# Patient Record
Sex: FEMALE | Race: WHITE | Employment: UNEMPLOYED | URBAN - NONMETROPOLITAN AREA
[De-identification: names, ages, dates, MRNs, and addresses within clinical notes are randomized per-mention and may not be internally consistent; named-entity substitution may affect disease eponyms.]

---

## 2021-01-05 ENCOUNTER — HOSPITAL ENCOUNTER (EMERGENCY)
Age: 21
Discharge: HOME | End: 2021-01-05
Payer: COMMERCIAL

## 2021-01-05 VITALS
WEIGHT: 140 LBS | BODY MASS INDEX: 25.76 KG/M2 | HEIGHT: 62 IN | TEMPERATURE: 97.6 F | DIASTOLIC BLOOD PRESSURE: 59 MMHG | RESPIRATION RATE: 20 BRPM | HEART RATE: 88 BPM | SYSTOLIC BLOOD PRESSURE: 113 MMHG | OXYGEN SATURATION: 97 %

## 2021-01-05 DIAGNOSIS — N30.00 ACUTE CYSTITIS WITHOUT HEMATURIA: Primary | ICD-10-CM

## 2021-01-05 LAB
BILIRUBIN URINE: NEGATIVE
BLOOD, URINE: NEGATIVE
CHARACTER, URINE: CLEAR
COLOR: ABNORMAL
GLUCOSE URINE: 100 MG/DL
KETONES, URINE: 15
LEUKOCYTE ESTERASE, URINE: NEGATIVE
NITRITE, URINE: POSITIVE
PH UA: 5.5 (ref 5–9)
PROTEIN UA: NEGATIVE MG/DL
SPECIFIC GRAVITY UA: >= 1.03 (ref 1–1.03)
UROBILINOGEN, URINE: 1 EU/DL (ref 0.2–1)

## 2021-01-05 PROCEDURE — 99203 OFFICE O/P NEW LOW 30 MIN: CPT

## 2021-01-05 PROCEDURE — 87086 URINE CULTURE/COLONY COUNT: CPT

## 2021-01-05 PROCEDURE — 99213 OFFICE O/P EST LOW 20 MIN: CPT

## 2021-01-05 PROCEDURE — 81003 URINALYSIS AUTO W/O SCOPE: CPT

## 2021-01-05 PROCEDURE — 99203 OFFICE O/P NEW LOW 30 MIN: CPT | Performed by: NURSE PRACTITIONER

## 2021-01-05 RX ORDER — NITROFURANTOIN 25; 75 MG/1; MG/1
100 CAPSULE ORAL 2 TIMES DAILY
Qty: 10 CAPSULE | Refills: 0 | Status: SHIPPED | OUTPATIENT
Start: 2021-01-05 | End: 2021-01-10

## 2021-01-05 RX ORDER — SULFAMETHOXAZOLE AND TRIMETHOPRIM 400; 80 MG/1; MG/1
1 TABLET ORAL 2 TIMES DAILY
COMMUNITY
End: 2021-01-05 | Stop reason: ALTCHOICE

## 2021-01-05 ASSESSMENT — ENCOUNTER SYMPTOMS
ABDOMINAL PAIN: 0
NAUSEA: 0
VOMITING: 0
SHORTNESS OF BREATH: 0
COUGH: 0

## 2021-01-05 NOTE — ED PROVIDER NOTES
[place] [address]  Urgent Care Encounter       CHIEF COMPLAINT       Chief Complaint   Patient presents with    Dysuria       Nurses Notes reviewed and I agree except as noted in the HPI. HISTORY OF PRESENT ILLNESS   Rivera Ballard is a 24 y.o. female who presents for evaluation of nausea, vomiting with continued dysuria. Patient reports UTI symptoms starting 3 to 4 days ago and was prescribed Bactrim for UTI without culture and sensitivity as she was seen via virtual visit. Patient unable to take medication without becoming nauseous or vomiting. Denies fevers, chills, abdominal and back pain. Reports taking Bactrim in the past with no complications. Patient reports she has been taking AZO for the symptoms at home which provides intermittent relief. The history is provided by the patient. REVIEW OF SYSTEMS     Review of Systems   Constitutional: Negative for chills and fever. Respiratory: Negative for cough and shortness of breath. Cardiovascular: Negative for chest pain. Gastrointestinal: Negative for abdominal pain, nausea and vomiting. Genitourinary: Positive for dysuria and frequency. Musculoskeletal: Negative for arthralgias and myalgias. Skin: Negative for rash. Neurological: Negative for headaches. PAST MEDICAL HISTORY   History reviewed. No pertinent past medical history. SURGICALHISTORY     Patient  has a past surgical history that includes Tympanostomy tube placement. CURRENT MEDICATIONS       Previous Medications    LACTOBACILLUS (AZO COMPLETE FEMININE BALANCE PO)    Take by mouth       ALLERGIES     Patient is has No Known Allergies. Patients   There is no immunization history on file for this patient. FAMILY HISTORY     Patient's family history is not on file. SOCIAL HISTORY     Patient  reports that she has never smoked. She has never used smokeless tobacco. She reports that she does not drink alcohol or use drugs.     PHYSICAL EXAM Positive (A) NEGATIVE    Leukocyte Esterase, Urine Negative NEGATIVE    Color, UA Tanesha (A) STRAW-YELLOW    Character, Urine Clear CLEAR-SL CLOUD       IMAGING:    No orders to display         EKG:  none    URGENT CARE COURSE:     Vitals:    01/05/21 1144   BP: (!) 113/59   Pulse: 88   Resp: 20   Temp: 97.6 °F (36.4 °C)   TempSrc: Temporal   SpO2: 97%   Weight: 140 lb (63.5 kg)   Height: 5' 2\" (1.575 m)       Medications - No data to display         PROCEDURES:  None    FINAL IMPRESSION      1. Acute cystitis without hematuria          DISPOSITION/ PLAN       Urine sample was obtained which does demonstrate a urinary tract infection at this time. I discussed with the patient that we will discontinue the Bactrim as she has not noticed any relief after 3 days and she will be placed on oral Macrobid for treatment of the urinary tract infection. She also be given Zofran for nausea and is advised to remain hydrated. She is advised to present to the ER if her symptoms worsen or follow-up with her PCP on an outpatient basis. She is agreeable to plan as discussed. PATIENT REFERRED TO:  Imtiaz Thompson MD  [address] [facility] A / [facility] YRIS [facility] [address]      DISCHARGE MEDICATIONS:  New Prescriptions    NITROFURANTOIN, MACROCRYSTAL-MONOHYDRATE, (MACROBID) 100 MG CAPSULE    Take 1 capsule by mouth 2 times daily for 5 days       Discontinued Medications    DESIPRAMINE (NOPRAMIN) 10 MG TABLET    Take 10 mg by mouth nightly. Take two tablets at bedside.      SULFAMETHOXAZOLE-TRIMETHOPRIM (BACTRIM;SEPTRA) 400-80 MG PER TABLET    Take 1 tablet by mouth 2 times daily       Current Discharge Medication List          MARÍA Melara CNP    (Please note that portions of this note were completed with a voice recognition program. Efforts were made to edit the dictations but occasionally words are mis-transcribed.)          MARÍA Melara CNP  01/05/21 1127

## 2021-01-05 NOTE — ED NOTES
Patient understood instructions verbally,  Follow up with PCP with any concerns, or worse UTI symptoms with elevated fevers follow up with ED. E-script,. ambulated self to lobby,stable condition.       Lv Dotson LPN  17/92/27 6628

## 2021-01-05 NOTE — ED TRIAGE NOTES
Patient to room with c/o continued pain with urination beginning three days ago. Continues on previously prescribed oral antibiotics x 3 days. States nausea while taking medication. Urine specimen obtained. Lab notified.

## 2021-01-07 LAB
ORGANISM: ABNORMAL
URINE CULTURE, ROUTINE: ABNORMAL

## 2021-03-13 LAB
BILIRUBIN URINE: ABNORMAL
BLOOD, URINE: NEGATIVE
CLARITY: ABNORMAL
COLOR: ABNORMAL
GLUCOSE URINE: NEGATIVE
KETONES, URINE: ABNORMAL
LEUKOCYTE ESTERASE, URINE: NEGATIVE
NITRITE, URINE: NEGATIVE
PH UA: 5.5 (ref 4.5–8)
PROTEIN UA: NEGATIVE
SPECIFIC GRAVITY UA: 1.03 (ref 1–1.03)
UROBILINOGEN, URINE: ABNORMAL

## 2021-12-30 LAB
BILIRUBIN URINE: ABNORMAL
BLOOD, URINE: NEGATIVE
CLARITY: CLEAR
COLOR: ABNORMAL
GLUCOSE URINE: NEGATIVE
KETONES, URINE: NEGATIVE
LEUKOCYTE ESTERASE, URINE: ABNORMAL
NITRITE, URINE: POSITIVE
PH UA: 5.5 (ref 4.5–8)
PROTEIN UA: ABNORMAL
SPECIFIC GRAVITY UA: 1.02 (ref 1–1.03)
UROBILINOGEN, URINE: NORMAL

## 2022-02-02 ENCOUNTER — OFFICE VISIT (OUTPATIENT)
Dept: UROLOGY | Age: 22
End: 2022-02-02
Payer: COMMERCIAL

## 2022-02-02 ENCOUNTER — TELEPHONE (OUTPATIENT)
Dept: UROLOGY | Age: 22
End: 2022-02-02

## 2022-02-02 VITALS
HEIGHT: 62 IN | SYSTOLIC BLOOD PRESSURE: 108 MMHG | WEIGHT: 143 LBS | BODY MASS INDEX: 26.31 KG/M2 | DIASTOLIC BLOOD PRESSURE: 62 MMHG

## 2022-02-02 DIAGNOSIS — R35.0 URINARY FREQUENCY: Primary | ICD-10-CM

## 2022-02-02 LAB
BILIRUBIN URINE: ABNORMAL
BLOOD URINE, POC: ABNORMAL
CHARACTER, URINE: CLEAR
COLOR, URINE: YELLOW
GLUCOSE URINE: NEGATIVE MG/DL
KETONES, URINE: 80
LEUKOCYTE CLUMPS, URINE: NEGATIVE
NITRITE, URINE: NEGATIVE
PH, URINE: 7 (ref 5–9)
POST VOID RESIDUAL (PVR): 0 ML
PROTEIN, URINE: NEGATIVE MG/DL
SPECIFIC GRAVITY, URINE: 1.02 (ref 1–1.03)
UROBILINOGEN, URINE: 0.2 EU/DL (ref 0–1)

## 2022-02-02 PROCEDURE — 99204 OFFICE O/P NEW MOD 45 MIN: CPT | Performed by: NURSE PRACTITIONER

## 2022-02-02 PROCEDURE — 51798 US URINE CAPACITY MEASURE: CPT | Performed by: NURSE PRACTITIONER

## 2022-02-02 PROCEDURE — 81003 URINALYSIS AUTO W/O SCOPE: CPT | Performed by: NURSE PRACTITIONER

## 2022-02-02 RX ORDER — NITROFURANTOIN 25; 75 MG/1; MG/1
CAPSULE ORAL
COMMUNITY
Start: 2022-01-29 | End: 2022-02-02

## 2022-02-02 RX ORDER — LEVONORGESTREL AND ETHINYL ESTRADIOL 0.1-0.02MG
KIT ORAL
COMMUNITY
Start: 2021-11-11

## 2022-02-02 RX ORDER — NITROFURANTOIN 25; 75 MG/1; MG/1
CAPSULE ORAL
Qty: 30 CAPSULE | Refills: 2 | Status: SHIPPED | OUTPATIENT
Start: 2022-02-02

## 2022-02-02 RX ORDER — AMOXICILLIN AND CLAVULANATE POTASSIUM 875; 125 MG/1; MG/1
1 TABLET, FILM COATED ORAL 2 TIMES DAILY
Qty: 14 TABLET | Refills: 0 | Status: SHIPPED | OUTPATIENT
Start: 2022-02-02 | End: 2022-02-09

## 2022-02-02 ASSESSMENT — ENCOUNTER SYMPTOMS
BACK PAIN: 0
ABDOMINAL PAIN: 0
VOMITING: 0
NAUSEA: 0

## 2022-02-02 NOTE — PATIENT INSTRUCTIONS
Patient Education        Urinary Tract Infection (UTI) in Women: Care Instructions  Overview     A urinary tract infection, or UTI, is a general term for an infection anywhere between the kidneys and the urethra (where urine comes out). Most UTIs are bladder infections. They often cause pain or burning when you urinate. UTIs are caused by bacteria and can be cured with antibiotics. Be sure to complete your treatment so that the infection does not get worse. Follow-up care is a key part of your treatment and safety. Be sure to make and go to all appointments, and call your doctor if you are having problems. It's also a good idea to know your test results and keep a list of the medicines you take. How can you care for yourself at home? · Take your antibiotics as directed. Do not stop taking them just because you feel better. You need to take the full course of antibiotics. · Drink extra water and other fluids for the next day or two. This will help make the urine less concentrated and help wash out the bacteria that are causing the infection. (If you have kidney, heart, or liver disease and have to limit fluids, talk with your doctor before you increase the amount of fluids you drink.)  · Avoid drinks that are carbonated or have caffeine. They can irritate the bladder. · Urinate often. Try to empty your bladder each time. · To relieve pain, take a hot bath or lay a heating pad set on low over your lower belly or genital area. Never go to sleep with a heating pad in place. To prevent UTIs  · Drink plenty of water each day. This helps you urinate often, which clears bacteria from your system. (If you have kidney, heart, or liver disease and have to limit fluids, talk with your doctor before you increase the amount of fluids you drink.)  · Urinate when you need to. · If you are sexually active, urinate right after you have sex. · Change sanitary pads often.   · Avoid douches, bubble baths, feminine hygiene sprays, and other feminine hygiene products that have deodorants. · After going to the bathroom, wipe from front to back. When should you call for help? Call your doctor now or seek immediate medical care if:    · Symptoms such as fever, chills, nausea, or vomiting get worse or appear for the first time.     · You have new pain in your back just below your rib cage. This is called flank pain.     · There is new blood or pus in your urine.     · You have any problems with your antibiotic medicine. Watch closely for changes in your health, and be sure to contact your doctor if:    · You are not getting better after taking an antibiotic for 2 days.     · Your symptoms go away but then come back. Where can you learn more? Go to https://My[employer] Networkseweb.[employer]. org and sign in to your Yellow Pages account. Enter G736 in the [employer] box to learn more about \"Urinary Tract Infection (UTI) in Women: Care Instructions. \"     If you do not have an account, please click on the \"Sign Up Now\" link. Current as of: February 10, 2021               Content Version: 13.1  © 8737-3089 [employer]. Care instructions adapted under license by [facility] ([facility]). If you have questions about a medical condition or this instruction, always ask your healthcare professional. [place] [address] any warranty or liability for your use of this information. Patient Education         Urinary Tract Infection: Here's Help (01:42)  Your health professional recommends that you watch this short online health video. Take a minute to learn about a urinary tract infection and what you can do to feel better. Purpose:  Briefly describes what a UTI is and how it's treated. Goal:  Users will understand what a urinary tract infection is and how it's treated. How to watch the video    Scan the QR code   OR Visit the website    https://hwi. se/r/F5zycacqpdv2j   Current as of: February 10, 2021               Content Version: 13.1  © 1105-0486 [employer]. Care instructions adapted under license by [facility] ([facility]). If you have questions about a medical condition or this instruction, always ask your healthcare professional. [address] any warranty or liability for your use of this information.

## 2022-02-02 NOTE — PROGRESS NOTES
[address]  [address]  [facility] [address]  Dept: [phone]  Loc: [phone]    Visit Date: 2/2/2022        HPI:     Mel Franco is a 22 y.o. female who presents today for:  Chief Complaint   Patient presents with    New Patient     dysuria, nocturia, frequency, burning with urinartion, referred by Megan Roque PA-C       HPI   Pt seen in referral by Bacilio Bunn for recurrent UTIs and dysuria. Christopher Willis reports a hx of UTIs in the last 1-2 years with recent worsening in incidence. She 2-3 UTIs in the last 4 weeks. She is currently being treated for a UTI with Macrobid after presenting to an urgent care in [place] on Sunday of this week. She reports she was told the culture showed Group B strep. She has yet to note improvement in symptoms despite the Macrobid. Pt notes a correlation between her UTIs and intercourse. She has one sexual partner currently and notes no recent change in partner. Started D mannose 500 mg TID in early January but had forgotten to take it for a few days. Denies hematuria, fever, chills. Urine culture from 12/29/21 with pan-sensitive E coli. Mom accompanies pt to appt today. Current Outpatient Medications   Medication Sig Dispense Refill    nitrofurantoin, macrocrystal-monohydrate, (MACROBID) 100 MG capsule TAKE 1 CAPSULE BY MOUTH TWICE DAILY      levonorgestrel-ethinyl estradiol (AVIANE;ALESSE;LESSINA) 0.1-20 MG-MCG per tablet take 1 tablet by mouth once daily      Lactobacillus (AZO COMPLETE FEMININE BALANCE PO) Take by mouth       No current facility-administered medications for this visit. Past Medical History  Christopher Willis  has no past medical history on file. Past Surgical History  The patient  has a past surgical history that includes Tympanostomy tube placement. Family History  This patient's family history is not on file.     Social History  Christopher Willis reports that she has never smoked. She has never used smokeless tobacco. She reports that she does not drink alcohol and does not use drugs. Subjective:      Review of Systems   Constitutional: Negative for activity change, appetite change, chills, diaphoresis, fatigue, fever and unexpected weight change. Gastrointestinal: Negative for abdominal pain, nausea and vomiting. Genitourinary: Positive for dysuria, frequency and urgency. Negative for decreased urine volume, difficulty urinating, flank pain and hematuria. Musculoskeletal: Negative for back pain. Objective:   /62   Ht 5' 2\" (1.575 m)   Wt 143 lb (64.9 kg)   BMI 26.16 kg/m²     Physical Exam  Vitals reviewed. Constitutional:       General: She is not in acute distress. Appearance: Normal appearance. She is well-developed. She is not ill-appearing or diaphoretic. HENT:      Head: Normocephalic and atraumatic. Right Ear: External ear normal.      Left Ear: External ear normal.      Nose: Nose normal.      Mouth/Throat:      Mouth: Mucous membranes are moist.   Eyes:      General: No scleral icterus. Right eye: No discharge. Left eye: No discharge. Neck:      Vascular: No JVD. Trachea: No tracheal deviation. Cardiovascular:      Rate and Rhythm: Normal rate and regular rhythm. Pulmonary:      Effort: Pulmonary effort is normal. No respiratory distress. Breath sounds: Normal breath sounds. Abdominal:      General: There is no distension. Tenderness: There is no abdominal tenderness. There is no right CVA tenderness or left CVA tenderness. Musculoskeletal:         General: No tenderness. Normal range of motion. Neurological:      Mental Status: She is alert and oriented to person, place, and time. Mental status is at baseline. Psychiatric:         Mood and Affect: Mood normal.         Behavior: Behavior normal.         Thought Content:  Thought content normal.         POC  Results for POC orders placed in visit on 02/02/22   POCT Urinalysis No Micro (Auto)   Result Value Ref Range    Glucose, Ur Negative NEGATIVE mg/dl    Bilirubin Urine Small (A)     Ketones, Urine 80 (A) NEGATIVE    Specific Gravity, Urine 1.025 1.002 - 1.030    Blood, UA POC Trace-intact NEGATIVE    pH, Urine 7.00 5.0 - 9.0    Protein, Urine Negative NEGATIVE mg/dl    Urobilinogen, Urine 0.20 0.0 - 1.0 eu/dl    Nitrite, Urine Negative NEGATIVE    Leukocyte Clumps, Urine Negative NEGATIVE    Color, Urine Yellow YELLOW-STRAW    Character, Urine Clear CLR-SL.CLOUD   poct post void residual   Result Value Ref Range    post void residual 0 ml       Patients recent PSA values are as follows  No results found for: PSA, PSADIA     Recent BUN/Creatinine:  Lab Results   Component Value Date    BUN 12 06/04/2015    CREATININE 0.6 06/04/2015       Assessment:   Recurrent UTI    Plan:     Bernard Max reports recent recurrent/unresolving UTIs. Currently on Macrobid for reported Group B strep in the urine. Notes symptoms have yet to improve. Will start Augmentin empirically and obtain culture results from outside facility for review. Pt aware of hygiene measures to aid in prevention of UTIs. Reviewed increasing oral fluids, cleansing/wiping from front to back, urinating after intercourse. Informational handout regarding UTIs and prevention included on AVS.     Given the post-coital incidence of her utis will start Gwen on Macrobid 100 mg PO once following intercourse. Script sent to pharmacy. Continue D mannose 500 mg PO TID and discussed increasing D mannose for mild symptoms.       F/u in 2-3 months with PVR

## 2022-02-02 NOTE — TELEPHONE ENCOUNTER
Pt had urine culture performed at [facility] in [place], [place] on Sunday. Can we get records and urine culture results for review? Thank-you.

## 2022-02-10 NOTE — TELEPHONE ENCOUNTER
Received records from [facility], [facility] in [facility], routed to [place]

## 2022-04-14 ENCOUNTER — OFFICE VISIT (OUTPATIENT)
Dept: UROLOGY | Age: 22
End: 2022-04-14
Payer: COMMERCIAL

## 2022-04-14 VITALS
DIASTOLIC BLOOD PRESSURE: 70 MMHG | BODY MASS INDEX: 26.13 KG/M2 | SYSTOLIC BLOOD PRESSURE: 112 MMHG | HEIGHT: 62 IN | WEIGHT: 142 LBS

## 2022-04-14 DIAGNOSIS — R35.0 URINARY FREQUENCY: Primary | ICD-10-CM

## 2022-04-14 LAB — POST VOID RESIDUAL (PVR): 61 ML

## 2022-04-14 PROCEDURE — 51798 US URINE CAPACITY MEASURE: CPT | Performed by: NURSE PRACTITIONER

## 2022-04-14 PROCEDURE — 99213 OFFICE O/P EST LOW 20 MIN: CPT | Performed by: NURSE PRACTITIONER

## 2022-04-14 ASSESSMENT — ENCOUNTER SYMPTOMS
VOMITING: 0
NAUSEA: 0
BACK PAIN: 0
ABDOMINAL PAIN: 0

## 2022-04-14 NOTE — PROGRESS NOTES
[address] [address] Leevard [address] [place] [id number]  Dept: [phone]  Loc: [phone]    Visit Date: 4/14/2022        HPI:     Chris Meza is a 22 y.o. female who presents today for:  Chief Complaint   Patient presents with    Follow-up    Urinary Frequency       HPI   Pt seen in follow up for recurrent UTIs and dysuria. Opal Andrea has a hx of UTIs over the last 1-2 years time with recent worsening in incidence. She was referred to our office after having 2-3 UTIs in 4 weeks time. She noted a correlation between her UTIs and intercourse. We had her start D mannose 500 mg TID and started post-coital Macrobid. She reports she is doing well and hasn't had any further utis. She is very happy with the control of her symptoms at this time. Current Outpatient Medications   Medication Sig Dispense Refill    levonorgestrel-ethinyl estradiol (AVIANE;ALESSE;LESSINA) 0.1-20 MG-MCG per tablet take 1 tablet by mouth once daily      D-Mannose 500 MG CAPS Take 500 mg by mouth 3 times daily 90 capsule     nitrofurantoin, macrocrystal-monohydrate, (MACROBID) 100 MG capsule Take 1 capsule by mouth following intercourse. 30 capsule 2    Lactobacillus (AZO COMPLETE FEMININE BALANCE PO) Take by mouth       No current facility-administered medications for this visit. Past Medical History  Opal Andrea  has no past medical history on file. Past Surgical History  The patient  has a past surgical history that includes Tympanostomy tube placement. Family History  This patient's family history is not on file. Social History  Opal Andrea  reports that she has never smoked. She has never used smokeless tobacco. She reports that she does not drink alcohol and does not use drugs. Subjective:      Review of Systems   Constitutional: Negative for activity change, appetite change, chills, diaphoresis, fatigue, fever and unexpected weight change. Gastrointestinal: Negative for abdominal pain, nausea and vomiting. Genitourinary: Negative for decreased urine volume, difficulty urinating, dysuria, flank pain, frequency, hematuria and urgency. Musculoskeletal: Negative for back pain. Objective:   /70   Ht 5' 2\" (1.575 m)   Wt 142 lb (64.4 kg)   BMI 25.97 kg/m²     Physical Exam  Vitals reviewed. Constitutional:       General: She is not in acute distress. Appearance: Normal appearance. She is well-developed. She is not ill-appearing or diaphoretic. HENT:      Head: Normocephalic and atraumatic. Right Ear: External ear normal.      Left Ear: External ear normal.      Nose: Nose normal.      Mouth/Throat:      Mouth: Mucous membranes are moist.   Eyes:      General: No scleral icterus. Right eye: No discharge. Left eye: No discharge. Neck:      Vascular: No JVD. Trachea: No tracheal deviation. Pulmonary:      Effort: Pulmonary effort is normal. No respiratory distress. Abdominal:      General: There is no distension. Tenderness: There is no abdominal tenderness. There is no right CVA tenderness or left CVA tenderness. Musculoskeletal:         General: No tenderness. Normal range of motion. Neurological:      Mental Status: She is alert and oriented to person, place, and time. Mental status is at baseline. Psychiatric:         Mood and Affect: Mood normal.         Behavior: Behavior normal.         Thought Content: Thought content normal.         POC  Results for POC orders placed in visit on 04/14/22   poct post void residual   Result Value Ref Range    post void residual 61 ml         Patients recent PSA values are as follows  No results found for: PSA, PSADIA     Recent BUN/Creatinine:  Lab Results   Component Value Date    BUN 12 06/04/2015    CREATININE 0.6 06/04/2015         Assessment:   Recurrent UTI    Plan:     Pt's symptoms improved on current regimen.   Continue D mannose 500 mg TID and post-coital macrobid. F/u in 6 months. Call for any worsening of symptoms prior.

## 2022-10-20 ENCOUNTER — OFFICE VISIT (OUTPATIENT)
Dept: UROLOGY | Age: 22
End: 2022-10-20
Payer: COMMERCIAL

## 2022-10-20 VITALS
HEIGHT: 62 IN | WEIGHT: 145 LBS | DIASTOLIC BLOOD PRESSURE: 60 MMHG | BODY MASS INDEX: 26.68 KG/M2 | SYSTOLIC BLOOD PRESSURE: 112 MMHG

## 2022-10-20 DIAGNOSIS — N39.0 RECURRENT URINARY TRACT INFECTION: ICD-10-CM

## 2022-10-20 DIAGNOSIS — R35.0 URINARY FREQUENCY: Primary | ICD-10-CM

## 2022-10-20 LAB
BILIRUBIN URINE: NEGATIVE
BLOOD URINE, POC: NEGATIVE
CHARACTER, URINE: CLEAR
COLOR, URINE: YELLOW
GLUCOSE URINE: NEGATIVE MG/DL
KETONES, URINE: NEGATIVE
LEUKOCYTE CLUMPS, URINE: NEGATIVE
NITRITE, URINE: NEGATIVE
PH, URINE: 6.5 (ref 5–9)
POST VOID RESIDUAL (PVR): 0 ML
PROTEIN, URINE: NEGATIVE MG/DL
SPECIFIC GRAVITY, URINE: 1.02 (ref 1–1.03)
UROBILINOGEN, URINE: 0.2 EU/DL (ref 0–1)

## 2022-10-20 PROCEDURE — 99213 OFFICE O/P EST LOW 20 MIN: CPT | Performed by: PHYSICIAN ASSISTANT

## 2022-10-20 PROCEDURE — 51798 US URINE CAPACITY MEASURE: CPT | Performed by: PHYSICIAN ASSISTANT

## 2022-10-20 PROCEDURE — 81003 URINALYSIS AUTO W/O SCOPE: CPT | Performed by: PHYSICIAN ASSISTANT

## 2022-10-20 NOTE — PROGRESS NOTES
[address] [facility] [address] [facility] [address]  Dept: [phone]  Loc: [phone]      Ms. Snow was seen in follow up for   Chief Complaint   Patient presents with    Follow-up    Urinary Frequency        HPI:  Ms. Alok Brunson is a 79-year-old female with a history of recurrent urinary tract infections. She was started on D-Mannose and post coital Macrobid and has been doing very well. She has not harbored a urinary tract infection since starting post-coital antibiotics. She denies any side effects with her medication. She denies any dysuria, gross hematuria, weakened stream, urinary retention, pelvic/abdominal/flank pain, or constipation. She presents today for further evaluation. No past medical history on file. Past Surgical History:   Procedure Laterality Date    TYMPANOSTOMY TUBE PLACEMENT         Current Outpatient Medications on File Prior to Visit   Medication Sig Dispense Refill    levonorgestrel-ethinyl estradiol (AVIANE;ALESSE;LESSINA) 0.1-20 MG-MCG per tablet take 1 tablet by mouth once daily      D-Mannose 500 MG CAPS Take 500 mg by mouth 3 times daily 90 capsule     nitrofurantoin, macrocrystal-monohydrate, (MACROBID) 100 MG capsule Take 1 capsule by mouth following intercourse. 30 capsule 2    Lactobacillus (AZO COMPLETE FEMININE BALANCE PO) Take by mouth       No current facility-administered medications on file prior to visit. No Known Allergies    No family history on file.     Social History     Socioeconomic History    Marital status: Single     Spouse name: Not on file    Number of children: Not on file    Years of education: Not on file    Highest education level: Not on file   Occupational History    Not on file   Tobacco Use    Smoking status: Never    Smokeless tobacco: Never   Substance and Sexual Activity    Alcohol use: No    Drug use: No    Sexual activity: Not on file   Other Topics Concern    Not on file   Social History Narrative    Not on file     Social Determinants of Health     Financial Resource Strain: Not on file   Food Insecurity: Not on file   Transportation Needs: Not on file   Physical Activity: Not on file   Stress: Not on file   Social Connections: Not on file   Intimate Partner Violence: Not on file   Housing Stability: Not on file       Review of Systems  Constitutional: Negative for fatigue, fever and unexpected weight change. HENT: Negative for congestion and trouble swallowing. Eyes: Negative for pain and itching. Respiratory: Negative for cough and shortness of breath. Cardiovascular: Negative for chest pain and leg swelling. Gastrointestinal: Negative for abdominal pain, constipation, diarrhea and nausea. Endocrine: Negative for cold intolerance and heat intolerance. Genitourinary: See HPI. Musculoskeletal: Negative for back pain and joint swelling. Skin: Negative for rash. Neurological: Negative for dizziness, weakness, numbness and headaches. Psychiatric/Behavioral: The patient is not nervous/anxious. Exam    /60   Ht 5' 2\" (1.575 m)   Wt 145 lb (65.8 kg)   BMI 26.52 kg/m²     Constitutional: Oriented to person, place, and time. Vital signs are normal. Appears well-developed and well-nourished. Cooperative. No distress. HENT:    Head: Normocephalic and atraumatic. Eyes: EOM are normal. Pupils are equal, round, and reactive to light. Right eye exhibits no discharge. Left eye exhibits no discharge. No scleral icterus. Neck: Trachea normal. No JVD present. Cardiovascular: Normal rate and regular rhythm. S1/S2. Pulmonary/Chest: Effort normal. No respiratory distress. No wheezes, rhonchi, or rales. Abdominal: Soft. Exhibits no distension or generalized tenderness. There is no rebound and no CVA tenderness. Musculoskeletal: No pitting edema or calf tenderness. Lymphadenopathy:   Right: No supraclavicular adenopathy present.    Left: No supraclavicular adenopathy present. Neurological: Alert and oriented to person, place, and time. No cranial nerve deficit. Skin: Skin is warm and dry. Not diaphoretic. Psychiatric: Normal mood and affect. Behavior is normal.   Nursing note and vitals reviewed. Labs    Results for POC orders placed in visit on 10/20/22   POCT Urinalysis No Micro (Auto)   Result Value Ref Range    Glucose, Ur Negative NEGATIVE mg/dl    Bilirubin Urine Negative     Ketones, Urine Negative NEGATIVE    Specific Gravity, Urine 1.025 1.002 - 1.030    Blood, UA POC Negative NEGATIVE    pH, Urine 6.50 5.0 - 9.0    Protein, Urine Negative NEGATIVE mg/dl    Urobilinogen, Urine 0.20 0.0 - 1.0 eu/dl    Nitrite, Urine Negative NEGATIVE    Leukocyte Clumps, Urine Negative NEGATIVE    Color, Urine Yellow YELLOW-STRAW    Character, Urine Clear CLR-SL.CLOUD   poct post void residual   Result Value Ref Range    post void residual 0 ml       Lab Results   Component Value Date    CREATININE 0.6 06/04/2015    BUN 12 06/04/2015     06/04/2015    K 4.0 06/04/2015     06/04/2015    CO2 24 06/04/2015         Assessment/Plan:    Recurrent Urinary Tract Infection- Asymptomatic on post coital Macrobid and D-Mannose three times daily. Continue current regimen. PVR is 0 ml. Call with any questions or concerns. Follow-up with [facility] in 12 months.

## 2022-10-23 PROBLEM — N39.0 RECURRENT URINARY TRACT INFECTION: Status: ACTIVE | Noted: 2022-10-23

## 2023-03-02 RX ORDER — NITROFURANTOIN 25; 75 MG/1; MG/1
CAPSULE ORAL
Qty: 30 CAPSULE | Refills: 2 | Status: SHIPPED | OUTPATIENT
Start: 2023-03-02

## 2023-03-02 NOTE — TELEPHONE ENCOUNTER
Cristi Sierra called requesting a refill on the following medications:  Requested Prescriptions     Pending Prescriptions Disp Refills    nitrofurantoin, macrocrystal-monohydrate, (MACROBID) 100 MG capsule [Pharmacy Med Name: NITROFURANTOIN MONO/MAC 100MG CAPS] 30 capsule 2     Sig: TAKE 1 CAPSULE BY MOUTH FOLLOWING INTERCOURSE     Pharmacy verified:  .[email or web address]      Date of last visit: 10/20/2022  Date of next visit (if applicable): 94/23/2861

## 2023-10-04 ENCOUNTER — OFFICE VISIT (OUTPATIENT)
Dept: UROLOGY | Age: 23
End: 2023-10-04
Payer: COMMERCIAL

## 2023-10-04 VITALS
WEIGHT: 150 LBS | HEIGHT: 62 IN | SYSTOLIC BLOOD PRESSURE: 120 MMHG | DIASTOLIC BLOOD PRESSURE: 68 MMHG | BODY MASS INDEX: 27.6 KG/M2

## 2023-10-04 DIAGNOSIS — R35.0 URINARY FREQUENCY: Primary | ICD-10-CM

## 2023-10-04 DIAGNOSIS — N39.0 RECURRENT URINARY TRACT INFECTION: ICD-10-CM

## 2023-10-04 LAB
BILIRUBIN URINE: NEGATIVE
BLOOD URINE, POC: NEGATIVE
CHARACTER, URINE: CLEAR
COLOR, URINE: YELLOW
GLUCOSE URINE: NEGATIVE MG/DL
KETONES, URINE: 40
LEUKOCYTE CLUMPS, URINE: NEGATIVE
NITRITE, URINE: NEGATIVE
PH, URINE: 6.5 (ref 5–9)
PROTEIN, URINE: NEGATIVE MG/DL
SPECIFIC GRAVITY, URINE: 1.02 (ref 1–1.03)
UROBILINOGEN, URINE: 0.2 EU/DL (ref 0–1)

## 2023-10-04 PROCEDURE — 81003 URINALYSIS AUTO W/O SCOPE: CPT | Performed by: NURSE PRACTITIONER

## 2023-10-04 PROCEDURE — 99214 OFFICE O/P EST MOD 30 MIN: CPT | Performed by: NURSE PRACTITIONER

## 2023-10-04 RX ORDER — NITROFURANTOIN 25; 75 MG/1; MG/1
CAPSULE ORAL
Qty: 30 CAPSULE | Refills: 2 | Status: SHIPPED | OUTPATIENT
Start: 2023-10-04

## 2023-10-04 ASSESSMENT — ENCOUNTER SYMPTOMS
NAUSEA: 0
VOMITING: 0
BACK PAIN: 0
ABDOMINAL PAIN: 0

## 2024-03-28 NOTE — PROGRESS NOTES
[id number]  [facility] [address]  Dept: [phone]  Loc: [phone]    Visit Date: 10/4/2023        HPI:     Josafat Wilkes is a 26 y.o. female who presents today for:  Chief Complaint   Patient presents with    Urinary Frequency    Urinary Tract Infection     Recurrent Urinary Tract Infection    Follow-up     1 yr follow up       HPI  Pt seen in follow up for recurrent UTI. Pt has a hx of UTIs. She had reduction in incidence after starting post-coital Macrobid and D-mannose 500 mg TID. Here today in follow-up. Reports no UTIs over the last year. Very happy with control of symptoms at this time. Current Outpatient Medications   Medication Sig Dispense Refill    nitrofurantoin, macrocrystal-monohydrate, (MACROBID) 100 MG capsule TAKE 1 CAPSULE BY MOUTH FOLLOWING INTERCOURSE 30 capsule 2    levonorgestrel-ethinyl estradiol (AVIANE;ALESSE;LESSINA) 0.1-20 MG-MCG per tablet take 1 tablet by mouth once daily      D-Mannose 500 MG CAPS Take 500 mg by mouth 3 times daily 90 capsule     Lactobacillus (AZO COMPLETE FEMININE BALANCE PO) Take by mouth       No current facility-administered medications for this visit. Past Medical History  Laura Lea  has a past medical history of Recurrent urinary tract infection. Past Surgical History  The patient  has a past surgical history that includes Tympanostomy tube placement. Family History  This patient's family history is not on file. Social History  Laura Lea  reports that she has never smoked. She has never used smokeless tobacco. She reports that she does not drink alcohol and does not use drugs. Subjective:      Review of Systems   Constitutional:  Negative for activity change, appetite change, chills, diaphoresis, fatigue, fever and unexpected weight change. Gastrointestinal:  Negative for abdominal pain, nausea and vomiting.    Genitourinary:  Negative
patient reports not having any symptoms
details…